# Patient Record
Sex: FEMALE | Race: WHITE | NOT HISPANIC OR LATINO | Employment: UNEMPLOYED | URBAN - METROPOLITAN AREA
[De-identification: names, ages, dates, MRNs, and addresses within clinical notes are randomized per-mention and may not be internally consistent; named-entity substitution may affect disease eponyms.]

---

## 2023-04-03 LAB
ALANINE AMINOTRANSFERASE (SGPT) (U/L) IN SER/PLAS: 29 U/L (ref 7–45)
ALBUMIN (G/DL) IN SER/PLAS: 4.5 G/DL (ref 3.4–5)
ALKALINE PHOSPHATASE (U/L) IN SER/PLAS: 73 U/L (ref 33–136)
ANION GAP IN SER/PLAS: 11 MMOL/L (ref 10–20)
ASPARTATE AMINOTRANSFERASE (SGOT) (U/L) IN SER/PLAS: 25 U/L (ref 9–39)
BILIRUBIN TOTAL (MG/DL) IN SER/PLAS: 0.6 MG/DL (ref 0–1.2)
CALCIDIOL (25 OH VITAMIN D3) (NG/ML) IN SER/PLAS: 49 NG/ML
CALCIUM (MG/DL) IN SER/PLAS: 9.5 MG/DL (ref 8.6–10.3)
CARBON DIOXIDE, TOTAL (MMOL/L) IN SER/PLAS: 27 MMOL/L (ref 21–32)
CHLORIDE (MMOL/L) IN SER/PLAS: 102 MMOL/L (ref 98–107)
CHOLESTEROL (MG/DL) IN SER/PLAS: 183 MG/DL (ref 0–199)
CHOLESTEROL IN HDL (MG/DL) IN SER/PLAS: 38.7 MG/DL
CHOLESTEROL/HDL RATIO: 4.7
CREATININE (MG/DL) IN SER/PLAS: 0.77 MG/DL (ref 0.5–1.05)
ERYTHROCYTE DISTRIBUTION WIDTH (RATIO) BY AUTOMATED COUNT: 13.1 % (ref 11.5–14.5)
ERYTHROCYTE MEAN CORPUSCULAR HEMOGLOBIN CONCENTRATION (G/DL) BY AUTOMATED: 33.6 G/DL (ref 32–36)
ERYTHROCYTE MEAN CORPUSCULAR VOLUME (FL) BY AUTOMATED COUNT: 88 FL (ref 80–100)
ERYTHROCYTES (10*6/UL) IN BLOOD BY AUTOMATED COUNT: 4.95 X10E12/L (ref 4–5.2)
GFR FEMALE: 85 ML/MIN/1.73M2
GLUCOSE (MG/DL) IN SER/PLAS: 128 MG/DL (ref 74–99)
HEMATOCRIT (%) IN BLOOD BY AUTOMATED COUNT: 43.5 % (ref 36–46)
HEMOGLOBIN (G/DL) IN BLOOD: 14.6 G/DL (ref 12–16)
LDL: 82 MG/DL (ref 0–99)
LEUKOCYTES (10*3/UL) IN BLOOD BY AUTOMATED COUNT: 5.4 X10E9/L (ref 4.4–11.3)
NON HDL CHOLESTEROL: 144 MG/DL
PLATELETS (10*3/UL) IN BLOOD AUTOMATED COUNT: 254 X10E9/L (ref 150–450)
POTASSIUM (MMOL/L) IN SER/PLAS: 4.4 MMOL/L (ref 3.5–5.3)
PROTEIN TOTAL: 7.6 G/DL (ref 6.4–8.2)
SODIUM (MMOL/L) IN SER/PLAS: 136 MMOL/L (ref 136–145)
THYROTROPIN (MIU/L) IN SER/PLAS BY DETECTION LIMIT <= 0.05 MIU/L: 1.04 MIU/L (ref 0.44–3.98)
TRIGLYCERIDE (MG/DL) IN SER/PLAS: 314 MG/DL (ref 0–149)
UREA NITROGEN (MG/DL) IN SER/PLAS: 22 MG/DL (ref 6–23)
VLDL: 63 MG/DL (ref 0–40)

## 2024-02-01 ENCOUNTER — LAB (OUTPATIENT)
Dept: LAB | Facility: LAB | Age: 69
End: 2024-02-01
Payer: COMMERCIAL

## 2024-02-01 DIAGNOSIS — R73.01 IMPAIRED FASTING GLUCOSE: Primary | ICD-10-CM

## 2024-02-01 DIAGNOSIS — E55.9 VITAMIN D DEFICIENCY, UNSPECIFIED: ICD-10-CM

## 2024-02-01 DIAGNOSIS — E03.9 HYPOTHYROIDISM, UNSPECIFIED: ICD-10-CM

## 2024-02-01 DIAGNOSIS — E78.00 PURE HYPERCHOLESTEROLEMIA, UNSPECIFIED: ICD-10-CM

## 2024-02-01 LAB
25(OH)D3 SERPL-MCNC: 49 NG/ML (ref 30–100)
ALBUMIN SERPL BCP-MCNC: 4.3 G/DL (ref 3.4–5)
ALP SERPL-CCNC: 74 U/L (ref 33–136)
ALT SERPL W P-5'-P-CCNC: 24 U/L (ref 7–45)
ANION GAP SERPL CALC-SCNC: 14 MMOL/L (ref 10–20)
AST SERPL W P-5'-P-CCNC: 21 U/L (ref 9–39)
BILIRUB SERPL-MCNC: 0.5 MG/DL (ref 0–1.2)
BUN SERPL-MCNC: 24 MG/DL (ref 6–23)
CALCIUM SERPL-MCNC: 8.9 MG/DL (ref 8.6–10.3)
CHLORIDE SERPL-SCNC: 102 MMOL/L (ref 98–107)
CHOLEST SERPL-MCNC: 162 MG/DL (ref 0–199)
CHOLESTEROL/HDL RATIO: 4.3
CO2 SERPL-SCNC: 25 MMOL/L (ref 21–32)
CREAT SERPL-MCNC: 0.77 MG/DL (ref 0.5–1.05)
EGFRCR SERPLBLD CKD-EPI 2021: 85 ML/MIN/1.73M*2
GLUCOSE SERPL-MCNC: 131 MG/DL (ref 74–99)
HDLC SERPL-MCNC: 37.7 MG/DL
LDLC SERPL CALC-MCNC: 70 MG/DL
NON HDL CHOLESTEROL: 124 MG/DL (ref 0–149)
POTASSIUM SERPL-SCNC: 4.2 MMOL/L (ref 3.5–5.3)
PROT SERPL-MCNC: 7.1 G/DL (ref 6.4–8.2)
SODIUM SERPL-SCNC: 137 MMOL/L (ref 136–145)
TRIGL SERPL-MCNC: 270 MG/DL (ref 0–149)
TSH SERPL-ACNC: 1.29 MIU/L (ref 0.44–3.98)
VLDL: 54 MG/DL (ref 0–40)

## 2024-02-01 PROCEDURE — 80061 LIPID PANEL: CPT

## 2024-02-01 PROCEDURE — 82306 VITAMIN D 25 HYDROXY: CPT

## 2024-02-01 PROCEDURE — 80053 COMPREHEN METABOLIC PANEL: CPT

## 2024-02-01 PROCEDURE — 84443 ASSAY THYROID STIM HORMONE: CPT

## 2024-02-01 PROCEDURE — 36415 COLL VENOUS BLD VENIPUNCTURE: CPT

## 2024-04-17 ENCOUNTER — HOSPITAL ENCOUNTER (OUTPATIENT)
Dept: RADIOLOGY | Facility: HOSPITAL | Age: 69
Discharge: HOME | End: 2024-04-17
Payer: COMMERCIAL

## 2024-04-17 ENCOUNTER — LAB (OUTPATIENT)
Dept: LAB | Facility: LAB | Age: 69
End: 2024-04-17
Payer: COMMERCIAL

## 2024-04-17 ENCOUNTER — APPOINTMENT (OUTPATIENT)
Dept: RADIOLOGY | Facility: HOSPITAL | Age: 69
End: 2024-04-17
Payer: COMMERCIAL

## 2024-04-17 DIAGNOSIS — M54.6 PAIN IN THORACIC SPINE: ICD-10-CM

## 2024-04-17 DIAGNOSIS — I10 ESSENTIAL (PRIMARY) HYPERTENSION: ICD-10-CM

## 2024-04-17 DIAGNOSIS — R06.09 OTHER FORMS OF DYSPNEA: ICD-10-CM

## 2024-04-17 DIAGNOSIS — R07.89 OTHER CHEST PAIN: ICD-10-CM

## 2024-04-17 DIAGNOSIS — M54.6 PAIN IN THORACIC SPINE: Primary | ICD-10-CM

## 2024-04-17 LAB
BUN SERPL-MCNC: 22 MG/DL (ref 6–23)
CREAT SERPL-MCNC: 0.79 MG/DL (ref 0.5–1.05)
EGFRCR SERPLBLD CKD-EPI 2021: 83 ML/MIN/1.73M*2

## 2024-04-17 PROCEDURE — 71275 CT ANGIOGRAPHY CHEST: CPT

## 2024-04-17 PROCEDURE — 84520 ASSAY OF UREA NITROGEN: CPT

## 2024-04-17 PROCEDURE — 71275 CT ANGIOGRAPHY CHEST: CPT | Performed by: RADIOLOGY

## 2024-04-17 PROCEDURE — 2550000001 HC RX 255 CONTRASTS

## 2024-04-17 PROCEDURE — 36415 COLL VENOUS BLD VENIPUNCTURE: CPT

## 2024-04-17 PROCEDURE — 82565 ASSAY OF CREATININE: CPT

## 2024-04-17 RX ADMIN — IOHEXOL 75 ML: 350 INJECTION, SOLUTION INTRAVENOUS at 17:04

## 2024-05-15 ENCOUNTER — HOSPITAL ENCOUNTER (OUTPATIENT)
Dept: RADIOLOGY | Facility: CLINIC | Age: 69
Discharge: HOME | End: 2024-05-15
Payer: COMMERCIAL

## 2024-05-15 DIAGNOSIS — M80.80XG: ICD-10-CM

## 2024-05-15 DIAGNOSIS — M84.48XG PATHOLOGICAL FRACTURE, OTHER SITE, SUBSEQUENT ENCOUNTER FOR FRACTURE WITH DELAYED HEALING: ICD-10-CM

## 2024-05-15 PROCEDURE — 77080 DXA BONE DENSITY AXIAL: CPT

## 2024-05-24 ENCOUNTER — HOSPITAL ENCOUNTER (OUTPATIENT)
Dept: RADIOLOGY | Facility: HOSPITAL | Age: 69
Discharge: HOME | End: 2024-05-24
Payer: COMMERCIAL

## 2024-05-24 DIAGNOSIS — M80.80XG: ICD-10-CM

## 2024-05-24 DIAGNOSIS — M84.48XG PATHOLOGICAL FRACTURE, OTHER SITE, SUBSEQUENT ENCOUNTER FOR FRACTURE WITH DELAYED HEALING: ICD-10-CM

## 2024-05-24 PROCEDURE — 78306 BONE IMAGING WHOLE BODY: CPT

## 2024-05-24 PROCEDURE — A9503 TC99M MEDRONATE: HCPCS | Performed by: RADIOLOGY

## 2024-05-24 PROCEDURE — 78306 BONE IMAGING WHOLE BODY: CPT | Performed by: RADIOLOGY

## 2024-05-24 PROCEDURE — 3430000001 HC RX 343 DIAGNOSTIC RADIOPHARMACEUTICALS: Performed by: RADIOLOGY

## 2024-05-24 RX ADMIN — TECHNETIUM TC 99M MEDRONATE 25 MILLICURIE: 25 INJECTION, POWDER, FOR SOLUTION INTRAVENOUS at 11:30

## 2024-06-05 PROBLEM — Z98.890 OTHER SPECIFIED POSTPROCEDURAL STATES: Status: ACTIVE | Noted: 2023-08-28

## 2024-06-05 PROBLEM — J45.909 ASTHMA WITHOUT STATUS ASTHMATICUS (HHS-HCC): Status: ACTIVE | Noted: 2024-06-05

## 2024-06-05 PROBLEM — M25.562 PAIN IN LEFT KNEE: Status: ACTIVE | Noted: 2023-08-28

## 2024-06-05 PROBLEM — Z12.31 ENCOUNTER FOR SCREENING MAMMOGRAM FOR MALIGNANT NEOPLASM OF BREAST: Status: ACTIVE | Noted: 2018-06-09

## 2024-06-05 PROBLEM — E55.9 VITAMIN D DEFICIENCY: Status: ACTIVE | Noted: 2024-06-05

## 2024-06-05 PROBLEM — M54.30 SCIATICA: Status: ACTIVE | Noted: 2024-06-05

## 2024-06-05 PROBLEM — M89.9 LYTIC BONE LESIONS ON XRAY: Status: ACTIVE | Noted: 2024-04-19

## 2024-06-05 PROBLEM — E03.9 HYPOTHYROIDISM, UNSPECIFIED: Status: ACTIVE | Noted: 2018-06-09

## 2024-06-05 PROBLEM — M54.40 LUMBAGO WITH SCIATICA, UNSPECIFIED SIDE: Status: ACTIVE | Noted: 2019-04-23

## 2024-06-05 PROBLEM — R73.01 IMPAIRED FASTING GLUCOSE: Status: ACTIVE | Noted: 2024-06-05

## 2024-06-05 PROBLEM — K21.9 GASTRO-ESOPHAGEAL REFLUX DISEASE WITHOUT ESOPHAGITIS: Status: ACTIVE | Noted: 2018-06-09

## 2024-06-05 PROBLEM — E03.9 HYPOTHYROIDISM: Status: ACTIVE | Noted: 2024-04-19

## 2024-06-05 PROBLEM — I10 BENIGN ESSENTIAL HYPERTENSION: Status: ACTIVE | Noted: 2024-04-19

## 2024-06-05 PROBLEM — Z96.652 PRESENCE OF LEFT ARTIFICIAL KNEE JOINT: Status: ACTIVE | Noted: 2023-08-28

## 2024-06-05 PROBLEM — E78.00 PURE HYPERCHOLESTEROLEMIA: Status: ACTIVE | Noted: 2024-04-19

## 2024-06-05 PROBLEM — S24.112A: Status: ACTIVE | Noted: 2024-05-09

## 2024-06-05 PROBLEM — Z96.652 HISTORY OF TOTAL LEFT KNEE REPLACEMENT: Status: ACTIVE | Noted: 2024-06-05

## 2024-06-05 PROBLEM — M17.11 PRIMARY OSTEOARTHRITIS OF RIGHT KNEE: Status: ACTIVE | Noted: 2024-06-05

## 2024-06-05 PROBLEM — N39.41 URINARY INCONTINENCE, URGE: Status: ACTIVE | Noted: 2024-06-05

## 2024-06-05 PROBLEM — M47.896 OTHER SPONDYLOSIS, LUMBAR REGION: Status: ACTIVE | Noted: 2019-04-23

## 2024-06-05 PROBLEM — S83.206S UNSPECIFIED TEAR OF UNSPECIFIED MENISCUS, CURRENT INJURY, RIGHT KNEE, SEQUELA: Status: ACTIVE | Noted: 2024-06-05

## 2024-06-05 PROBLEM — M54.9 SPINE PAIN: Status: ACTIVE | Noted: 2024-04-18

## 2024-06-05 RX ORDER — LIDOCAINE AND MENTHOL 2; .5 G/50ML; G/50ML
LIQUID TOPICAL
COMMUNITY

## 2024-06-05 RX ORDER — DICLOFENAC SODIUM 10 MG/G
GEL TOPICAL
COMMUNITY
Start: 2024-03-25

## 2024-06-05 RX ORDER — NAPROXEN 500 MG/1
1 TABLET ORAL
COMMUNITY
Start: 2024-04-19

## 2024-06-05 RX ORDER — PREDNISONE 10 MG/1
TABLET ORAL
COMMUNITY
Start: 2024-03-27

## 2024-06-05 RX ORDER — SULFAMETHOXAZOLE AND TRIMETHOPRIM 800; 160 MG/1; MG/1
TABLET ORAL
COMMUNITY
Start: 2024-02-05

## 2024-06-05 RX ORDER — CLINDAMYCIN HYDROCHLORIDE 300 MG/1
CAPSULE ORAL
COMMUNITY
Start: 2024-03-05

## 2024-06-05 RX ORDER — FLUTICASONE PROPIONATE 50 MCG
SPRAY, SUSPENSION (ML) NASAL EVERY 24 HOURS
COMMUNITY

## 2024-06-05 RX ORDER — BLOOD SUGAR DIAGNOSTIC
STRIP MISCELLANEOUS
COMMUNITY
Start: 2024-02-09

## 2024-06-05 RX ORDER — MELOXICAM 15 MG/1
TABLET ORAL
COMMUNITY
Start: 2024-03-10

## 2024-06-05 RX ORDER — GABAPENTIN 100 MG/1
CAPSULE ORAL
COMMUNITY
Start: 2024-06-04 | End: 2024-09-04

## 2024-06-05 RX ORDER — LEVOTHYROXINE SODIUM 100 UG/1
100 TABLET ORAL
COMMUNITY

## 2024-06-05 RX ORDER — METHYLPREDNISOLONE 4 MG/1
TABLET ORAL
COMMUNITY
Start: 2024-04-09

## 2024-06-05 RX ORDER — CHOLECALCIFEROL (VITAMIN D3) 50 MCG
TABLET ORAL EVERY 24 HOURS
COMMUNITY

## 2024-06-05 RX ORDER — ACETAMINOPHEN 500 MG
TABLET ORAL EVERY 6 HOURS
COMMUNITY

## 2024-06-05 RX ORDER — ALBUTEROL SULFATE 90 UG/1
AEROSOL, METERED RESPIRATORY (INHALATION)
COMMUNITY
Start: 2023-08-02

## 2024-06-05 RX ORDER — COLCHICINE 0.6 MG/1
TABLET ORAL
COMMUNITY
Start: 2024-03-27

## 2024-06-05 RX ORDER — VALSARTAN 80 MG/1
TABLET ORAL EVERY 24 HOURS
COMMUNITY
Start: 2024-03-27

## 2024-06-05 RX ORDER — LANCETS 33 GAUGE
EACH MISCELLANEOUS
COMMUNITY
Start: 2024-02-09

## 2024-06-06 ENCOUNTER — OFFICE VISIT (OUTPATIENT)
Dept: HEMATOLOGY/ONCOLOGY | Facility: CLINIC | Age: 69
End: 2024-06-06
Payer: MEDICARE

## 2024-06-06 VITALS
HEART RATE: 80 BPM | RESPIRATION RATE: 20 BRPM | DIASTOLIC BLOOD PRESSURE: 79 MMHG | BODY MASS INDEX: 33.74 KG/M2 | OXYGEN SATURATION: 97 % | SYSTOLIC BLOOD PRESSURE: 126 MMHG | TEMPERATURE: 98.2 F | WEIGHT: 160.72 LBS | HEIGHT: 58 IN

## 2024-06-06 DIAGNOSIS — M84.48XG PATHOLOGICAL FRACTURE OF THORACIC VERTEBRA WITH DELAYED HEALING, SUBSEQUENT ENCOUNTER: Primary | ICD-10-CM

## 2024-06-06 PROCEDURE — 1159F MED LIST DOCD IN RCRD: CPT | Performed by: INTERNAL MEDICINE

## 2024-06-06 PROCEDURE — 99205 OFFICE O/P NEW HI 60 MIN: CPT | Performed by: INTERNAL MEDICINE

## 2024-06-06 PROCEDURE — 3074F SYST BP LT 130 MM HG: CPT | Performed by: INTERNAL MEDICINE

## 2024-06-06 PROCEDURE — 99215 OFFICE O/P EST HI 40 MIN: CPT | Performed by: INTERNAL MEDICINE

## 2024-06-06 PROCEDURE — 1125F AMNT PAIN NOTED PAIN PRSNT: CPT | Performed by: INTERNAL MEDICINE

## 2024-06-06 PROCEDURE — 1160F RVW MEDS BY RX/DR IN RCRD: CPT | Performed by: INTERNAL MEDICINE

## 2024-06-06 PROCEDURE — 3078F DIAST BP <80 MM HG: CPT | Performed by: INTERNAL MEDICINE

## 2024-06-06 ASSESSMENT — PAIN SCALES - GENERAL: PAINLEVEL: 8

## 2024-06-06 NOTE — PATIENT INSTRUCTIONS
New patient visit today with Dr. Rivera for T5 spinal lesion.     No follow up needed at this time. Call the office at [phone] with any future questions or needs.

## 2024-06-06 NOTE — PROGRESS NOTES
PATIENT NAME:  Opal David  MRN:   [id number]  DATE:  May 23, 2024      Medical oncology consultation    Reason, T5 pathological fracture  History of Present Illness     Ms. Teresa is a 68-year-old female seen today for initial medical oncology consultation regarding recently noted spinal lesion.     Briefly, and as detailed below, she initially developed upper back pains in January of this year.  These worsened over the next few months and she ultimately had workup including x-ray, CT, and MRI imaging.  These revealed a lesion in T5 with an associated pathological fracture.  This was biopsied in April 2024 and showed fragments of bone with remodeling and prominent reactive lymphoid aggregates but did not show any evidence of malignancy.         Medical History is notable for hypertension, hypothyroidism, and asthma.  She has been up-to-date on all age-appropriate cancer screening with reportedly unremarkable prior colonoscopies and a negative Cologuard in 2023.  Last mammogram was around 1 month prior and she has not had any abnormalities on this or prior mammograms.  No longer undergoing Pap smear surveillance as she is status post hysterectomy.     Oncology History     01/24: Onset upper back pain/muscle spasms  03/24: X-Ray w/degenerative spine changes  04/24:  CT w/ thoracic spine lesion, no concerning primary lesions  MRI w/T5 compression fracture              Bx: + Reactive lymphoid aggregates, no evidence of malignancy   Initial Med Onc evaluation by Dr. Noah Bourgeois   4/19/24 , CT abdominal pelvis, benign  4/22/24 , CT chest, no obvious pathology    Bilateral mammogram, benign  5/24/24 , bone scan, focal increased radiotracer  deposition of T5 vertebral body, no other abnormal foci of radiotracer was seen, postsurgical changes of left knee arthroplasty.  6/4/24 , spine surgical evaluation done by Dr. Fidencio Lawrence  Past Medical HIstory  Hypertension  Thyroidism  Asthma     Surgical  History  Hysterectomy  Knee replacement     Family History  Father was diagnosed with acute leukemia in his early 80s and passed away from this.  Mother is alive and has never been diagnosed with any cancers.  She has 5 siblings-1 brother was diagnosed with prostate cancer at around age 70.  She has 2 sons, 1 was diagnosed with testicular cancer in his late 20s and is now in remission.     Social History  No current or prior tobacco, alcohol, or illicit substance use.     Medications         Current Outpatient Medications   Medication Sig Dispense Refill    VALSARTAN ORAL Take by mouth once daily.        naproxen (NAPROSYN) 500 mg tablet Take 1 tablet by mouth three times a day with meals.   0    fluticasone (FLONASE ALLERGY RELIEF) 50 mcg/actuation nasal spray 2 spray in each nostril        levothyroxine (SYNTHROID) 100 mcg tablet          sodium chloride (OCEAN NASAL) 0.65 % nasal spray 2 sprays in each nostril as needed        fexofenadine (ALLEGRA) 180 mg tablet Take one capsule QD          No current facility-administered medications for this visit.         Allergies       ALLERGIES   Allergen Reactions    Adhesive             Other: See Comments    Codeine              Other: See Comments, Vomiting    Erythromycin         GI Upset    Hydrocodone-Acetami* Vomiting    Oxycodone-Acetamino* Vomiting    Penicillins          Hives, Other: See Comments    Doxycycline          Other: See Comments    Iodine               Other: See Comments    Morphine             Vomiting    Statins-Hmg-Coa Red* Other: See Comments         REVIEW OF SYSTEMS:   Patient noticed to have midthoracic pain in January 2024.  Getting worse, increase his activity sometimes radiate anteriorly to chest wall, no headache and dizziness, no weight loss, no shortness of breath, no nausea vomiting, no abdominal pain, no diarrhea and constipation, patient is still active, retired    PHYSICAL EXAM: Vitals are stable, body weight 72.9 kg    GEN: awake,  alert, interactive, and appropriate  HEENT: PERRL, EOMI, no lesions noted  CV: Regular rate and rhythm, no murmurs, rubs, or gallops  PULM: Clear to auscultation bilaterally, no wheeze, crackles, or ronchi  ABD: Soft, nontender, nondistended, +BS x 4 quadrants  EXT: Distal pulses intact, no peripheral edema  SKIN: Warm and dry, no lesions noted  NEURO: Aox3, sensory and motor function grossly intact bilaterally  Back examination did show significant tenderness in the midthoracic area      LABORATORY:           FINAL DIAGNOSIS   Bone, T5, needle core biopsy:  - Fragments of bone with remodeling and prominent reactive lymphoid aggregates. See comment.    Electronically signed by Nancy Christianson MD on 5/1/2024 at  2:10 PM   Diagnosis Comment     Imaging shows T5 lytic lesion with almost complete effacement of the neuroforamina.     This case was reviewed in conjunction with Dr. Eric Moreau (section of hematopathology).  The histologic sections show new bone formation and fibrous tissue with scattered aggregates of predominantly small lymphocytes.  Plasma cells are scattered throughout.  Immunohistochemical stains show the lymphoid aggregates to consist predominantly of CD3 positive cells admixed with fewer CD20 positive cells.  CD43 is similar to the CD3.    and MUM1 highlights scattered plasma cells.  Kappa and lambda immunostains each show scattered positive plasma cells.  A multiplex, ultrasensitive in situ hybridization stain for kappa and lambda light chains shows polytypic plasma cells and polytypic B cells within the lymphoid aggregates.  A cytokeratin AE1/AE3 stain is negative.       Overall, these findings show reactive lymphoid aggregates and bone with remodeling.  There is no evidence of a lymphoproliferative disorder. However, this might represent reactive bone at the edge of a non-sampled lesion. The differential diagnosis also includes chronic osteomyelitis. However, there is no evidence  of acute osteomyelitis on this biopsy. If the lesion persists and clinical concern remains, a rebiopsy is recommended.       IMAGING:       CT 4/19/24     IMPRESSION:    No CT evidence of neoplastic disease in the lungs or mediastinum.    The study confirms a compression fracture and lytic osseous process  involving the T5 vertebral body and likely its posterior elements.  The  most common differential considerations include a plasmacytoma versus  metastatic disease.      IMPRESSION:    No potential primary malignancy or metastatic disease in the abdomen or  pelvis.    Hepatic steatosis.   No evidence of monoclonal gammopathy, CBC CMP within normal limit    Bone scan did show abnormal radiotracer  deposition of T5     IMPRESSION:      1, T5 pathological fracture     briefly, she developed upper back pains in early 2024 and workup ultimately revealed a T5 lesion with associated pathologic compression fracture.  Biopsy in April 2024 showed no evidence of malignancy.  She has no known cancer history or concerning risk factors.  Systemic imaging did not reveal any other concerning lesions and laboratory panel was overall unremarkable.    Clinically I am suspecting underlying malignancy.      Agreed with overall plan as dictated by spine surgery, Dr. Fidencio Lawrence .     I answered all questions to the patient's satisfaction and provided my contact information for any questions or concerns that may arise in the future.  I told patient I am agree with current treatment plan     PLAN:       Follow-up after spine surgery evaluation per patient wish    Time spent 60 minutes